# Patient Record
Sex: MALE
[De-identification: names, ages, dates, MRNs, and addresses within clinical notes are randomized per-mention and may not be internally consistent; named-entity substitution may affect disease eponyms.]

---
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
## 2024-04-04 ENCOUNTER — NON-APPOINTMENT (OUTPATIENT)
Age: 19
End: 2024-04-04

## 2024-05-03 ENCOUNTER — APPOINTMENT (OUTPATIENT)
Dept: INTERNAL MEDICINE | Facility: CLINIC | Age: 19
End: 2024-05-03
Payer: MEDICAID

## 2024-05-03 VITALS — BODY MASS INDEX: 19.55 KG/M2 | HEIGHT: 69 IN | WEIGHT: 132 LBS

## 2024-05-03 DIAGNOSIS — R07.89 OTHER CHEST PAIN: ICD-10-CM

## 2024-05-03 DIAGNOSIS — Z00.00 ENCOUNTER FOR GENERAL ADULT MEDICAL EXAMINATION W/OUT ABNORMAL FINDINGS: ICD-10-CM

## 2024-05-03 PROCEDURE — 99395 PREV VISIT EST AGE 18-39: CPT

## 2024-05-03 PROCEDURE — G0444 DEPRESSION SCREEN ANNUAL: CPT | Mod: 59
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
## 2024-05-03 NOTE — HEALTH RISK ASSESSMENT
[Excellent] : ~his/her~  mood as  excellent [No] : No [No falls in past year] : Patient reported no falls in the past year [Little interest or pleasure doing things] : 1) Little interest or pleasure doing things [Feeling down, depressed, or hopeless] : 2) Feeling down, depressed, or hopeless [0] : 2) Feeling down, depressed, or hopeless: Not at all (0) [PHQ-2 Negative - No further assessment needed] : PHQ-2 Negative - No further assessment needed [[id number]] : 0 [HIV test declined] : HIV test declined [Hepatitis C test declined] : Hepatitis C test declined [Never] : Never
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
## 2024-09-23 ENCOUNTER — APPOINTMENT (OUTPATIENT)
Dept: INTERNAL MEDICINE | Facility: CLINIC | Age: 19
End: 2024-09-23
Payer: MEDICAID

## 2024-09-23 DIAGNOSIS — G47.9 SLEEP DISORDER, UNSPECIFIED: ICD-10-CM

## 2024-09-23 PROCEDURE — 99214 OFFICE O/P EST MOD 30 MIN: CPT

## 2024-09-23 NOTE — HEALTH RISK ASSESSMENT
[No] : No [No falls in past year] : Patient reported no falls in the past year [Little interest or pleasure doing things] : 1) Little interest or pleasure doing things [Feeling down, depressed, or hopeless] : 2) Feeling down, depressed, or hopeless [0] : 2) Feeling down, depressed, or hopeless: Not at all (0)
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
## 2024-09-23 NOTE — ASSESSMENT
[FreeTextEntry1] : sleep disturbance he now denies denies antecendent psych issues and is fine right now discussed sleep hygiene avoidl caffeine, use room for sleep

## 2025-01-18 ENCOUNTER — NON-APPOINTMENT (OUTPATIENT)
Age: 20
End: 2025-01-18

## 2025-01-29 ENCOUNTER — APPOINTMENT (OUTPATIENT)
Dept: INTERNAL MEDICINE | Facility: CLINIC | Age: 20
End: 2025-01-29
Payer: MEDICAID

## 2025-01-29 VITALS — WEIGHT: 132 LBS | TEMPERATURE: 97.8 F | BODY MASS INDEX: 19.55 KG/M2 | HEIGHT: 69 IN

## 2025-01-29 VITALS — RESPIRATION RATE: 12 BRPM | HEART RATE: 67 BPM | DIASTOLIC BLOOD PRESSURE: 78 MMHG | SYSTOLIC BLOOD PRESSURE: 120 MMHG

## 2025-01-29 DIAGNOSIS — M79.2 NEURALGIA AND NEURITIS, UNSPECIFIED: ICD-10-CM

## 2025-01-29 PROCEDURE — 99213 OFFICE O/P EST LOW 20 MIN: CPT

## 2025-01-29 RX ORDER — GABAPENTIN 100 MG/1
100 CAPSULE ORAL
Qty: 30 | Refills: 5 | Status: ACTIVE | COMMUNITY
Start: 2025-01-29 | End: 1900-01-01

## 2025-05-09 ENCOUNTER — APPOINTMENT (OUTPATIENT)
Dept: INTERNAL MEDICINE | Facility: CLINIC | Age: 20
End: 2025-05-09

## 2025-07-10 ENCOUNTER — APPOINTMENT (OUTPATIENT)
Dept: INTERNAL MEDICINE | Facility: CLINIC | Age: 20
End: 2025-07-10

## 2025-07-28 ENCOUNTER — APPOINTMENT (OUTPATIENT)
Dept: INTERNAL MEDICINE | Facility: CLINIC | Age: 20
End: 2025-07-28